# Patient Record
Sex: FEMALE | Race: WHITE | NOT HISPANIC OR LATINO
[De-identification: names, ages, dates, MRNs, and addresses within clinical notes are randomized per-mention and may not be internally consistent; named-entity substitution may affect disease eponyms.]

---

## 2019-04-02 ENCOUNTER — RESULT REVIEW (OUTPATIENT)
Age: 49
End: 2019-04-02

## 2020-12-15 ENCOUNTER — RESULT REVIEW (OUTPATIENT)
Age: 50
End: 2020-12-15

## 2022-03-31 ENCOUNTER — RESULT REVIEW (OUTPATIENT)
Age: 52
End: 2022-03-31

## 2022-04-01 PROBLEM — Z00.00 ENCOUNTER FOR PREVENTIVE HEALTH EXAMINATION: Status: ACTIVE | Noted: 2022-04-01

## 2022-04-21 ENCOUNTER — APPOINTMENT (OUTPATIENT)
Dept: SURGERY | Facility: CLINIC | Age: 52
End: 2022-04-21
Payer: COMMERCIAL

## 2022-04-21 ENCOUNTER — NON-APPOINTMENT (OUTPATIENT)
Age: 52
End: 2022-04-21

## 2022-04-21 VITALS
HEIGHT: 63 IN | TEMPERATURE: 97.5 F | SYSTOLIC BLOOD PRESSURE: 121 MMHG | DIASTOLIC BLOOD PRESSURE: 80 MMHG | BODY MASS INDEX: 26.4 KG/M2 | WEIGHT: 149 LBS | HEART RATE: 62 BPM

## 2022-04-21 PROCEDURE — 46600 DIAGNOSTIC ANOSCOPY SPX: CPT

## 2022-04-21 PROCEDURE — 99204 OFFICE O/P NEW MOD 45 MIN: CPT | Mod: 25

## 2022-04-21 RX ORDER — LEVOTHYROXINE SODIUM 137 UG/1
TABLET ORAL
Refills: 0 | Status: ACTIVE | COMMUNITY

## 2022-04-21 RX ORDER — ESCITALOPRAM OXALATE 10 MG/1
10 TABLET, FILM COATED ORAL
Refills: 0 | Status: ACTIVE | COMMUNITY

## 2022-04-21 NOTE — ASSESSMENT
[FreeTextEntry1] : options discussed, \par pt scheduling screening baseline C scope soon with Dr Bahena, I would suggest hemorrhoidectomy after the C scope has been completed.

## 2022-04-21 NOTE — HISTORY OF PRESENT ILLNESS
[de-identified] : The patient is referred by Dr Reardon for evaluation and discussion regarding chronic intermittently swelling left lateral hemorrhoids. She has noted swelling, with occasional pain, bleeding over the past 13 yrs since her last pregnancy. She is ready to have them addressed. She has  tried multiple creams and suppositories. They flare up with swelling and sometimes bleeding. \par

## 2022-04-21 NOTE — PHYSICAL EXAM
[Normal Breath Sounds] : Normal breath sounds [No Rash or Lesion] : No rash or lesion [Alert] : alert [Oriented to Person] : oriented to person [Oriented to Place] : oriented to place [Oriented to Time] : oriented to time [Calm] : calm [de-identified] : NAD [de-identified] : anoscopy performed--Grade 3 hems, left lateral and right posterolateral, Grade 2 right williams lateral

## 2022-05-05 ENCOUNTER — TRANSCRIPTION ENCOUNTER (OUTPATIENT)
Age: 52
End: 2022-05-05

## 2022-11-17 ENCOUNTER — APPOINTMENT (OUTPATIENT)
Dept: SURGERY | Facility: CLINIC | Age: 52
End: 2022-11-17

## 2022-11-17 VITALS
WEIGHT: 142 LBS | DIASTOLIC BLOOD PRESSURE: 81 MMHG | HEART RATE: 64 BPM | BODY MASS INDEX: 25.16 KG/M2 | TEMPERATURE: 98.1 F | HEIGHT: 63 IN | SYSTOLIC BLOOD PRESSURE: 125 MMHG

## 2022-11-17 DIAGNOSIS — K64.2 THIRD DEGREE HEMORRHOIDS: ICD-10-CM

## 2022-11-17 PROCEDURE — 99214 OFFICE O/P EST MOD 30 MIN: CPT

## 2022-11-17 NOTE — ASSESSMENT
[FreeTextEntry1] : hemorrhoidectomy recommended, options discussed, pt The risks benefits and alternatives were discussed and the patient agrees to the described plan.\par

## 2022-11-17 NOTE — HISTORY OF PRESENT ILLNESS
[de-identified] : Pt here for f/u discussion regarding upcoming hemorrhoidectomy  [de-identified] : previous note from 4/21/22:\par \par The patient is referred by Dr Reardon for evaluation and discussion regarding chronic intermittently swelling left lateral hemorrhoids. She has noted swelling, with occasional pain, bleeding over the past 13 yrs since her last pregnancy. She is ready to have them addressed. She has tried multiple creams and suppositories. They flare up with swelling and sometimes bleeding. \par  \par options discussed, \par pt scheduling screening baseline C scope soon with Dr Bahena, I would suggest hemorrhoidectomy after the C scope has been completed.\par  \par  \par \par  \par

## 2022-12-05 ENCOUNTER — APPOINTMENT (OUTPATIENT)
Dept: SURGERY | Facility: HOSPITAL | Age: 52
End: 2022-12-05

## 2022-12-05 ENCOUNTER — RESULT REVIEW (OUTPATIENT)
Age: 52
End: 2022-12-05